# Patient Record
Sex: MALE | Race: BLACK OR AFRICAN AMERICAN | NOT HISPANIC OR LATINO | URBAN - METROPOLITAN AREA
[De-identification: names, ages, dates, MRNs, and addresses within clinical notes are randomized per-mention and may not be internally consistent; named-entity substitution may affect disease eponyms.]

---

## 2020-06-09 ENCOUNTER — EMERGENCY (EMERGENCY)
Facility: HOSPITAL | Age: 40
LOS: 1 days | Discharge: HOME | End: 2020-06-09
Attending: EMERGENCY MEDICINE | Admitting: EMERGENCY MEDICINE
Payer: MEDICAID

## 2020-06-09 VITALS
RESPIRATION RATE: 16 BRPM | HEART RATE: 65 BPM | SYSTOLIC BLOOD PRESSURE: 128 MMHG | OXYGEN SATURATION: 96 % | DIASTOLIC BLOOD PRESSURE: 87 MMHG | TEMPERATURE: 98 F | WEIGHT: 139.99 LBS | HEIGHT: 67 IN

## 2020-06-09 PROCEDURE — 99053 MED SERV 10PM-8AM 24 HR FAC: CPT

## 2020-06-09 PROCEDURE — 99283 EMERGENCY DEPT VISIT LOW MDM: CPT

## 2020-06-09 RX ORDER — FLUCONAZOLE 150 MG/1
150 TABLET ORAL ONCE
Refills: 0 | Status: COMPLETED | OUTPATIENT
Start: 2020-06-09 | End: 2020-06-09

## 2020-06-09 RX ORDER — NYSTATIN/TRIAMCINOLONE ACET
1 OINTMENT (GRAM) TOPICAL ONCE
Refills: 0 | Status: DISCONTINUED | OUTPATIENT
Start: 2020-06-09 | End: 2020-06-09

## 2020-06-09 RX ORDER — NYSTATIN CREAM 100000 [USP'U]/G
1 CREAM TOPICAL
Qty: 1 | Refills: 0
Start: 2020-06-09 | End: 2020-06-22

## 2020-06-09 RX ORDER — NYSTATIN CREAM 100000 [USP'U]/G
1 CREAM TOPICAL ONCE
Refills: 0 | Status: COMPLETED | OUTPATIENT
Start: 2020-06-09 | End: 2020-06-09

## 2020-06-09 RX ORDER — FLUCONAZOLE 150 MG/1
1 TABLET ORAL
Qty: 1 | Refills: 0
Start: 2020-06-09 | End: 2020-06-09

## 2020-06-09 RX ADMIN — NYSTATIN CREAM 1 APPLICATION(S): 100000 CREAM TOPICAL at 09:51

## 2020-06-09 RX ADMIN — FLUCONAZOLE 150 MILLIGRAM(S): 150 TABLET ORAL at 09:51

## 2020-06-09 NOTE — ED ADULT TRIAGE NOTE - CHIEF COMPLAINT QUOTE
Pt with complaint of genital itching and rash.  No drainage. Denies STI exposure. Pt states he is a  and mistakenly scratched the area a couple of days ago and has been itching since.  Pt with history of anemia..

## 2020-06-09 NOTE — ED PROVIDER NOTE - PATIENT PORTAL LINK FT
You can access the FollowMyHealth Patient Portal offered by [facility] by registering at the following website: [email or web address]. By joining [employer]’s FollowMyHealth portal, you will also be able to view your health information using other applications (apps) compatible with our system.

## 2020-06-09 NOTE — ED PROVIDER NOTE - CLINICAL SUMMARY MEDICAL DECISION MAKING FREE TEXT BOX
39 y/o male presents with tinea cruris rash. Will provide Diflucan and topical Nystatin. Patient would like paper prescription as he does not have a pharmacy and is not sure where his insurance is accepted.

## 2020-06-09 NOTE — ED ADULT NURSE NOTE - CHPI ED NUR SYMPTOMS NEG
no body aches/no confusion/no decreased eating/drinking/no vomiting/no pain/no petechia/no fever/no inflammation/no itching/no chills

## 2020-06-09 NOTE — ED ADULT NURSE NOTE - NSIMPLEMENTINTERV_GEN_ALL_ED
Implemented All Universal Safety Interventions:  [place] to call system. Call bell, personal items and telephone within reach. Instruct patient to call for assistance. Room bathroom lighting operational. Non-slip footwear when patient is off stretcher. Physically safe environment: no spills, clutter or unnecessary equipment. Stretcher in lowest position, wheels locked, appropriate side rails in place.

## 2020-06-09 NOTE — ED PROVIDER NOTE - GENITOURINARY, MLM
External genitals normal. + tinea cruris rash under the scrotum bilaterally, skin is slightly edematous and whitish without exudate.

## 2020-06-09 NOTE — ED PROVIDER NOTE - NSFOLLOWUPINSTRUCTIONS_ED_ALL_ED_FT
Jock Itch (Tinea Cruris)     Jock itch is an itchy rash in the groin and upper thigh area. It is a skin infection that is caused by a type of germ that lives in dark, damp places (fungus). The rash usually goes away in 2–3 weeks with treatment.  Follow these instructions at home:  Skin care     Use skin creams, ointments, or powders exactly as told by your doctor. Wear loose-fitting clothes. Clothes should not rub against your groin area. Men should wear boxer shorts or loose-fitting underwear. Keep your groin area clean and dry.   Change your underwear every day. Change out of wet bathing suits as soon as you can. After bathing, use a separate towel to dry your groin area. Dry the area gently and completely. Avoid hot baths and showers. Hot water can make itching worse. Do not scratch the area.     General instructions     Take and apply over-the-counter and prescription medicines only as told by your doctor. Do not share towels or clothing with other people. Wash your hands often with soap and water, especially after touching your groin area. If you do not have soap and water, use alcohol-based hand . Contact a doctor if:  Your rash:  Gets worse. Does not get better after 2 weeks of treatment. Spreads. Comes back after treatment is done. You have any of the following:     A fever. New or worsening redness, swelling, or pain around your rash. Fluid, blood, or pus coming from your rash.    Summary  Jock itch is an itchy rash. It affects the groin and upper thigh area. Jock itch usually goes away in 2–3 weeks with treatment. Keep your groin area clean and dry.     This information is not intended to replace advice given to you by your health care provider. Make sure you discuss any questions you have with your health care provider.

## 2020-06-09 NOTE — ED PROVIDER NOTE - PSYCHIATRIC, MLM
Alert and oriented to person, place, time/situation. normal mood and affect. normal... Well appearing, well nourished, awake, alert, oriented to person, place, time/situation and in no apparent distress.

## 2020-06-09 NOTE — ED PROVIDER NOTE - OBJECTIVE STATEMENT
41 y/o male with no significant PMHx presents to ED c/o groin rash for the past few days. Patient reports scratching himself in the shower and at work, and does not know whether he contaminated himself. Denies any fever, chills, or STI symptoms. Also denies any COVID-19 symptoms, and does not believe he has had COVID-19 this year. Reports he drinks a lot of soda.

## 2020-06-13 DIAGNOSIS — R21 RASH AND OTHER NONSPECIFIC SKIN ERUPTION: ICD-10-CM

## 2020-06-13 DIAGNOSIS — B35.6 TINEA CRURIS: ICD-10-CM

## 2020-08-09 ENCOUNTER — EMERGENCY (EMERGENCY)
Facility: HOSPITAL | Age: 40
LOS: 1 days | Discharge: HOME | End: 2020-08-09
Attending: EMERGENCY MEDICINE | Admitting: EMERGENCY MEDICINE
Payer: MEDICAID

## 2020-08-09 VITALS
DIASTOLIC BLOOD PRESSURE: 91 MMHG | HEART RATE: 74 BPM | SYSTOLIC BLOOD PRESSURE: 131 MMHG | OXYGEN SATURATION: 96 % | WEIGHT: 139.99 LBS | TEMPERATURE: 98 F | RESPIRATION RATE: 18 BRPM

## 2020-08-09 PROBLEM — Z78.9 OTHER SPECIFIED HEALTH STATUS: Chronic | Status: ACTIVE | Noted: 2020-06-09

## 2020-08-09 PROCEDURE — 73660 X-RAY EXAM OF TOE(S): CPT | Mod: 26,RT

## 2020-08-09 PROCEDURE — 99283 EMERGENCY DEPT VISIT LOW MDM: CPT | Mod: 25

## 2020-08-09 RX ORDER — BACITRACIN ZINC 500 UNIT/G
1 OINTMENT IN PACKET (EA) TOPICAL ONCE
Refills: 0 | Status: DISCONTINUED | OUTPATIENT
Start: 2020-08-09 | End: 2020-08-13

## 2020-08-09 NOTE — ED PROVIDER NOTE - PATIENT PORTAL LINK FT
You can access the FollowMyHealth Patient Portal offered by [facility] by registering at the following website: [email or web address]. By joining [employer]’s FollowMyHealth portal, you will also be able to view your health information using other applications (apps) compatible with our system.

## 2020-08-09 NOTE — ED PROVIDER NOTE - CHIEF COMPLAINT
The patient is a 40y Male complaining of The patient is a 40y Male complaining of RIGHT pinky toe pain

## 2020-08-09 NOTE — ED PROVIDER NOTE - NSFOLLOWUPINSTRUCTIONS_ED_ALL_ED_FT
Please see your primary care doctor in the next 2-3 days for a repeat evaluation.  Please take all of today's paperwork with you.  If you don't have a doctor or would like help finding a new one, please contact our call center at [phone].    Please return immediately to the Emergency Department if you have pain, fever, trouble breathing, a rash, or if you have any other problems or concerns at all.   You can reach us at [phone], 24 hours a day, 7 days a week.     Apply topical antibiotics as directed.  Keep clean and dry.  Follow up with the foot doctor/podiatrist as directed.  Please return immediately if you have any problems or concerns at all, especially if you have redness, drainage, fever, streaking, pus, problems walking or standing or if you have any other problems or concerns at all.

## 2020-08-09 NOTE — ED PROVIDER NOTE - OBJECTIVE STATEMENT
41 y/o patient states he is a [employer] employee and FRIDAY night he dropped a metal trash can on his RIGHT pinky toe with pain and swelling since.  Today he had pain while trying to run in sneakers so he came here for evaluation.  On inspection there is a healing non-infected laceration of the tip of the toe without involvement of the nailbed, or nail.  No other obvious injury.  No head injury.  No foot pain/swelling/injury/tenderness.  No evidence of infection, no streaking, no redness/drainage, no fever, no other complaints.

## 2020-08-09 NOTE — ED PROVIDER NOTE - DIAGNOSTIC INTERPRETATION
Interpreted by ED physician  RIGHT toes x-ray  No fracture, no dislocation (joint spaces grossly normal), no Foreign Body noted, soft tissue normal

## 2020-08-09 NOTE — ED PROVIDER NOTE - CLINICAL SUMMARY MEDICAL DECISION MAKING FREE TEXT BOX
Patient reports that he works for [employer] and dropped a metal trash can on his RIGHT pinky toe on FRIDAY night with pain and swelling since.  Pain is ok while not bearing weight but hurts when he walks so he came here for eval.  Toe has a healing laceration which is now too aged to suture.  Laceration does not involve nailbed and does not appear infected.  XRAY shows no obvious fracture.  Recommend tylenol/ibuprofen, topical antibiotics, elevation, local wound care and close followup.

## 2020-08-09 NOTE — ED PROVIDER NOTE - PHYSICAL EXAMINATION
RIGHT pinky toe with mild swelling/tenderness, healing small laceration to tip of toe (not involving nail or matrix) which is too old to close.  Area is clean, dry, without signs of infection.  No bone exposed.  No deformities.  Normal pulses, movement, sensation, strength, tendon function.
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
## 2020-08-12 DIAGNOSIS — S91.114A LACERATION WITHOUT FOREIGN BODY OF RIGHT LESSER TOE(S) WITHOUT DAMAGE TO NAIL, INITIAL ENCOUNTER: ICD-10-CM
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
## 2020-08-12 DIAGNOSIS — M79.676 PAIN IN UNSPECIFIED TOE(S): ICD-10-CM
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
## 2020-08-12 DIAGNOSIS — F17.200 NICOTINE DEPENDENCE, UNSPECIFIED, UNCOMPLICATED: ICD-10-CM
